# Patient Record
Sex: FEMALE | NOT HISPANIC OR LATINO | Employment: FULL TIME | URBAN - METROPOLITAN AREA
[De-identification: names, ages, dates, MRNs, and addresses within clinical notes are randomized per-mention and may not be internally consistent; named-entity substitution may affect disease eponyms.]

---

## 2017-04-11 ENCOUNTER — THERAPY VISIT (OUTPATIENT)
Dept: PHYSICAL THERAPY | Facility: CLINIC | Age: 47
End: 2017-04-11
Payer: COMMERCIAL

## 2017-04-11 DIAGNOSIS — M25.551 PAIN OF RIGHT HIP JOINT: Primary | ICD-10-CM

## 2017-04-11 PROCEDURE — 97110 THERAPEUTIC EXERCISES: CPT | Mod: GP | Performed by: PHYSICAL THERAPIST

## 2017-04-11 PROCEDURE — 97161 PT EVAL LOW COMPLEX 20 MIN: CPT | Mod: GP | Performed by: PHYSICAL THERAPIST

## 2017-04-11 ASSESSMENT — ACTIVITIES OF DAILY LIVING (ADL)
ROLLING_OVER_IN_BED: NO DIFFICULTY AT ALL
STANDING_FOR_15_MINUTES: NO DIFFICULTY AT ALL
GETTING_INTO_AND_OUT_OF_AN_AVERAGE_CAR: NO DIFFICULTY AT ALL
GOING_DOWN_1_FLIGHT_OF_STAIRS: NO DIFFICULTY AT ALL
HOW_WOULD_YOU_RATE_YOUR_CURRENT_LEVEL_OF_FUNCTION_DURING_YOUR_USUAL_ACTIVITIES_OF_DAILY_LIVING_FROM_0_TO_100_WITH_100_BEING_YOUR_LEVEL_OF_FUNCTION_PRIOR_TO_YOUR_HIP_PROBLEM_AND_0_BEING_THE_INABILITY_TO_PERFORM_ANY_OF_YOUR_USUAL_DAILY_ACTIVITIES?: 4
GOING_UP_1_FLIGHT_OF_STAIRS: NO DIFFICULTY AT ALL
WALKING_INITIALLY: NO DIFFICULTY AT ALL
HOS_ADL_COUNT: 17
HOS_ADL_SCORE(%): 100
STEPPING_UP_AND_DOWN_CURBS: NO DIFFICULTY AT ALL
WALKING_APPROXIMATELY_10_MINUTES: NO DIFFICULTY AT ALL
RECREATIONAL_ACTIVITIES: NO DIFFICULTY AT ALL
SITTING_FOR_15_MINUTES: NO DIFFICULTY AT ALL
WALKING_15_MINUTES_OR_GREATER: NO DIFFICULTY AT ALL
TWISTING/PIVOTING_ON_INVOLVED_LEG: NO DIFFICULTY AT ALL
HEAVY_WORK: NO DIFFICULTY AT ALL
HOS_ADL_ITEM_SCORE_TOTAL: 68
DEEP_SQUATTING: NO DIFFICULTY AT ALL
PUTTING_ON_SOCKS_AND_SHOES: NO DIFFICULTY AT ALL
GETTING_INTO_AND_OUT_OF_A_BATHTUB: NO DIFFICULTY AT ALL
LIGHT_TO_MODERATE_WORK: NO DIFFICULTY AT ALL
HOS_ADL_HIGHEST_POTENTIAL_SCORE: 68
WALKING_DOWN_STEEP_HILLS: NO DIFFICULTY AT ALL
WALKING_UP_STEEP_HILLS: NO DIFFICULTY AT ALL

## 2017-04-11 NOTE — MR AVS SNAPSHOT
"              After Visit Summary   4/11/2017    Jo Ann Black    MRN: [id number]           Patient Information     Date Of Birth          1970        Visit Information        Provider Department      4/11/2017 11:20 AM Maida Felton, PT [facility]        Today's Diagnoses     Pain of right hip joint    -  1       Follow-ups after your visit        Your next 10 appointments already scheduled     Apr 19, 2017  9:20 AM CDT   SUKHDEEP Running with Maida Felton PT   [facility] ([place]  )    [address] [place] [address] [address]  [facility] [id number] [phone]            Apr 25, 2017  9:20 AM CDT   SUKHDEEP Running with Maida Felton PT   [facility] ([place]  )    [address] [place] [address] [address]  [facility] [id number] [phone]              Who to contact     If you have questions or need follow up information about today's clinic visit or your schedule please contact [facility] directly at [phone].  Normal or non-critical lab and imaging results will be communicated to you by Social Strategy 1hart, letter or phone within 4 business days after the clinic has received the results. If you do not hear from us within 7 days, please contact the clinic through Social Strategy 1hart or phone. If you have a critical or abnormal lab result, we will notify you by phone as soon as possible.  Submit refill requests through CÃœR or call your pharmacy and they will forward the refill request to us. Please allow 3 business days for your refill to be completed.          Additional Information About Your Visit        MyChart Information     CÃœR lets you send messages to your doctor, view your test results, renew your prescriptions, schedule appointments and more. To sign up, go to [email or web address] . Click on \"Log in\" on the left side of the " "screen, which will take you to the Welcome page. Then click on \"Sign up Now\" on the right side of the page.     You will be asked to enter the access code listed below, as well as some personal information. Please follow the directions to create your username and password.     Your access code is: [email or web address]  Expires: 7/10/2017  1:31 PM     Your access code will  in 90 days. If you need help or a new code, please call your [place] clinic or [phone].        Care EveryWhere ID     This is your Care EveryWhere ID. This could be used by other organizations to access your [place] medical records  [id number]         Blood Pressure from Last 3 Encounters:   No data found for BP    Weight from Last 3 Encounters:   No data found for Wt              We Performed the Following     HC PT EVAL, LOW COMPLEXITY     SUKHDEEP INITIAL EVAL REPORT     THERAPEUTIC EXERCISES        Primary Care Provider Office Phone # Fax #    Josephine Katelin Marsh [phone]       [facility] [address]  [place] [address]        Thank you!     Thank you for choosing [facility]  for your care. Our goal is always to provide you with excellent care. Hearing back from our patients is one way we can continue to improve our services. Please take a few minutes to complete the written survey that you may receive in the mail after your visit with us. Thank you!             Your Updated Medication List - Protect others around you: Learn how to safely use, store and throw away your medicines at www.disposemymeds.org.          This list is accurate as of: 17  1:31 PM.  Always use your most recent med list.                   Brand Name Dispense Instructions for use    MULTIVITAMIN PO          OMEGA-3 FISH OIL PO            "

## 2017-04-11 NOTE — PROGRESS NOTES
Subjective:                                       Pertinent medical history includes:  None.  Medical allergies: no.  Other surgeries include:  None reported.  Current medications:  None as reported by patient.  Current occupation is attroney.  Patient is working in normal job without restrictions.  Primary job tasks include:  Prolonged sitting.    Barriers include:  None as reported by patient.    Red flags:  None as reported by patient.                        Objective:    System    Physical Exam    General     ROS    Assessment/Plan:

## 2017-04-11 NOTE — PROGRESS NOTES
Subjective:            Pt reports insidious onset of chronic R anterior and posterior R hip tight aching pain over one year ago, March 2016. She feels it may be related to compensating for previous L ankle pain that was treated successfully with PT several years ago..      Radiates to:  Low back.   and is intermittent and reported as 3/10.  Associated symptoms:  Loss of motion/stiffness. Pain is worse during the day.  Symptoms are exacerbated by sitting and walking (anterior tightness with walking, aching deep anterior and post with sitting) Relieved by: some position changes and easy walking helpful some times, has had accupuncture that is helpful temporarily.  Since onset symptoms are gradually worsening.        General health as reported by patient is excellent.                      Red flags:  None as reported by the patient.                        Objective:    Standing Alignment:          Pelvic:  Iliac crest high R  Hip deviations alignment: R>L anteversion         Gait:  Hip hike R, dec R hip ext, IR B R>L femur      Non-Weight Bearing:      Hip:  Femoral anteversion L and femoral anteversion R      Flexibility/Screens:   Positive screens:  Lumbar (mod loss lumbar ext wtih L pelvic rotation )    Lower Extremity:      Decreased right lower extremity flexibility:  Adductors and Hip Flexors          Ankle/Foot Evaluation            MOBILITY TESTING:       Talocrural Left: hypomobile                                                   Hip Evaluation  Hip PROM:              External Rotation: Left:   Right: mod loss                   Hip Palpation:      Right hip tenderness present at:  hip flexors and Piriformis  Right hip tenderness not present at:  Greater Trachanter  Functional Testing:          Quad:    Single leg squat:   Left:    Moderate loss of control and femoral IR  Right:   Significant loss of control and femoral IR                     General     ROS    Assessment/Plan:      Patient is a 47 year old  female with right side hip complaints.    Patient has the following significant findings with corresponding treatment plan.                Diagnosis 1:  R hip pain   Pain -  hot/cold therapy, manual therapy and self management  Decreased ROM/flexibility - manual therapy and therapeutic exercise  Decreased joint mobility - manual therapy and therapeutic exercise  Decreased strength - therapeutic exercise and therapeutic activities  Impaired balance - neuro re-education and therapeutic activities  Impaired gait - gait training  Impaired muscle performance - neuro re-education  Decreased function - therapeutic activities  Impaired posture - neuro re-education  Instability -  Therapeutic Activity  Therapeutic Exercise    Therapy Evaluation Codes:   1) History comprised of:   Personal factors that impact the plan of care:      None.    Comorbidity factors that impact the plan of care are:      None.     Medications impacting care: None.  2) Examination of Body Systems comprised of:   Body structures and functions that impact the plan of care:      Hip.   Activity limitations that impact the plan of care are:      Sitting.  3) Clinical presentation characteristics are:   Stable/Uncomplicated.  4) Decision-Making    Low complexity using standardized patient assessment instrument and/or measureable assessment of functional outcome.  Cumulative Therapy Evaluation is: Low complexity.    Previous and current functional limitations:  (See Goal Flow Sheet for this information)    Short term and Long term goals: (See Goal Flow Sheet for this information)     Communication ability:  Patient appears to be able to clearly communicate and understand verbal and written communication and follow directions correctly.  Treatment Explanation - The following has been discussed with the patient:   RX ordered/plan of care  Anticipated outcomes  Possible risks and side effects  This patient would benefit from PT intervention to resume normal  activities.   Rehab potential is excellent.    Frequency:  1 X week, once daily  Duration:  for 4 weeks  Discharge Plan:  Achieve all LTG.  Independent in home treatment program.  Reach maximal therapeutic benefit.    Please refer to the daily flowsheet for treatment today, total treatment time and time spent performing 1:1 timed codes.

## 2017-04-19 ENCOUNTER — THERAPY VISIT (OUTPATIENT)
Dept: PHYSICAL THERAPY | Facility: CLINIC | Age: 47
End: 2017-04-19
Payer: COMMERCIAL

## 2017-04-19 DIAGNOSIS — M25.551 PAIN OF RIGHT HIP JOINT: ICD-10-CM

## 2017-04-19 PROCEDURE — 97112 NEUROMUSCULAR REEDUCATION: CPT | Mod: GP | Performed by: PHYSICAL THERAPIST

## 2017-04-19 PROCEDURE — 97140 MANUAL THERAPY 1/> REGIONS: CPT | Mod: GP | Performed by: PHYSICAL THERAPIST

## 2017-04-19 PROCEDURE — 97110 THERAPEUTIC EXERCISES: CPT | Mod: GP | Performed by: PHYSICAL THERAPIST

## 2017-05-02 ENCOUNTER — THERAPY VISIT (OUTPATIENT)
Dept: PHYSICAL THERAPY | Facility: CLINIC | Age: 47
End: 2017-05-02
Payer: COMMERCIAL

## 2017-05-02 DIAGNOSIS — M25.551 PAIN OF RIGHT HIP JOINT: ICD-10-CM

## 2017-05-02 PROCEDURE — 97110 THERAPEUTIC EXERCISES: CPT | Mod: GP | Performed by: PHYSICAL THERAPIST

## 2017-05-02 PROCEDURE — 97140 MANUAL THERAPY 1/> REGIONS: CPT | Mod: GP | Performed by: PHYSICAL THERAPIST

## 2023-02-01 ENCOUNTER — OFFICE VISIT (OUTPATIENT)
Dept: VASCULAR SURGERY | Facility: CLINIC | Age: 53
End: 2023-02-01
Payer: COMMERCIAL

## 2023-02-01 DIAGNOSIS — I83.813 VARICOSE VEINS OF BILATERAL LOWER EXTREMITIES WITH PAIN: Primary | ICD-10-CM

## 2023-02-01 PROCEDURE — 99203 OFFICE O/P NEW LOW 30 MIN: CPT | Performed by: SURGERY

## 2023-02-01 RX ORDER — VITAMIN B COMPLEX
TABLET ORAL DAILY
COMMUNITY

## 2023-02-01 RX ORDER — BIOTIN 10000 MCG
CAPSULE ORAL
COMMUNITY
End: 2023-09-13

## 2023-02-01 NOTE — PROGRESS NOTES
VEINSOLUTIONS CONSULTATION    HPI:    Jo Ann Black is a pleasant 52 year old female who is familiar to me, having undergone endovenous ablation and phlebectomies on both lower extremities.  We treated her right GSV, right leg ASV, right SSV and her left GSV. She developed a few recurrent varicose veins about five years post op was progressed until about 5 years ago.  She wears compression and has done so for years, especially with flying.    She describes pain in her left thigh as an ache, tightness, heaviness and a tenderness, worse after prolonged sitting and not really improving with elevation, compression or walking.  She has no history of deep vein thrombosis, superficial thrombophlebitis but does admit to some intermittent swelling.    The symptoms interfere with actives of daily living because sitting for long periods of time causes her legs to become sore, making it difficult for her to be up and about as she would like.  This interferes actives daily living in terms of getting her household chores taking care of and taking care of her family.    Her family history is significant for varicose veins in her mother and in her maternal grandfather    PAST MEDICAL HISTORY: Medical History  Medical History Date Comments   Dizziness       Chest pressure       PAST SURGICAL HISTORY: Bilateral radiofrequency ablation: Right great saphenous, small saphenous and anterior excessively saphenous veins and left great saphenous vein    FAMILY HISTORY:   Good Health Daughter 3       Good Health Daughter 4       Heart Disease Father    Stent at age 65, prior MI. CABG at age 73   Other Mother    Fainting episodes     Family History  Relation Name Status Comments   Daughter 1   Alive     Daughter 2   Alive     Daughter 3         Daughter 4         Father    Alive     Mother             SOCIAL HISTORY:   Smoking Tobacco: Never           Smokeless Tobacco: Never             Social History  Alcohol Use Standard Drinks/Week  Comments   Yes 0 (1 standard drink = 0.6 oz pure alcohol) 1 drink per month     REVIEW OF SYSTEMS: Review Of Systems  Skin: negative  Eyes: negative  Ears/Nose/Throat: negative  Respiratory: No shortness of breath, dyspnea on exertion, cough, or hemoptysis  Cardiovascular: negative, cold induced vasospasm of fingers and toes  Gastrointestinal: negative  Genitourinary: negative  Musculoskeletal: Leg pain, leg swelling  Neurologic: negative  Psychiatric: negative  Hematologic/Lymphatic/Immunologic: negative  Endocrine: negative      Vital signs:  There were no vitals taken for this visit.    Current Outpatient Medications   Medication Sig Dispense Refill     Biotin 10 MG CAPS        Calcium-Magnesium-Zinc 333-133-5 MG TABS per tablet Take 1 tablet by mouth daily       Cyanocobalamin (VITAMIN B 12 PO)        Ferrous Gluconate-C-Folic Acid (IRON-C PO)        Vitamin D3 (CHOLECALCIFEROL) 25 mcg (1000 units) tablet Take by mouth daily         PHYSICAL EXAM:  General: Pleasant, NAD.   HEENT: Normocephalic, atraumatic, external ears and nose normal.   Respiratory: Normal respiratory effort.   Cardiovascular: Pulse is regular.   Musculoskeletal: Gait and station normal.  The joints of her fingers and toes without deformity.  There is no cyanosis of her nailbeds.   EXTREMITIES: Right lower extremity: 4 to 6 mm varicosities coursing from the medial right calf down toward the ankle and extending somewhat posteriorly.  Duskiness and coolness of her toes.  Trace edema.    Left lower extremity: 4 to 5 mm varicosity coursing from the proximal anterior left thigh, down the anteromedial thigh and onto the left medial calf.  Trace edema.  Duskiness of the toes of her left foot.  PULSES: R/L (3=normal pulse, 0=no palpable pulse) dorsalis pedis: 0/0; posterior tibial: 3/3.      Neurologic: Grossly normal  Psychiatric: Mood, affect, judgment and insight are normal     ASSESSMENT:   Recurrent, bilateral lower extremity varicose veins,  symptomatic.  We discussed the lower extremity vein anatomy, pathophysiology of venous insufficiency and the option of continued conservative management with small risk of superficial thrombophlebitis, bleeding and progression of the disease process.  She has worn compression hose for years and exercises vigorously, regularly.    She asked if the cold induced vasospasm of her toes could be related to the venous insufficiency.  I do not believe that it is.  She is not severely limited by the cold induced vasospasm.    She wished to have these recurrent varicosities further evaluated due to the symptoms, therefore we will order a bilateral lower extremity venous competency study, the results which we discussed via a video visit.    Options for treating superficial insufficiency with endovenous ablation, sclerotherapy and/or phlebectomies were discussed.  Risks of treatment including bleeding, infection, nerve injury, deep ecchymosis, scarring and hyperpigmentation were discussed.  She voiced understanding and her questions were answered.    PLAN:  Bilateral lower extremity venous competency studies with video visit to discuss results.    Julian Prince MD    Dictated using Dragon voice recognition software which may result in transcription errors          VEIN CLINIC LEG DRAWING:

## 2023-02-01 NOTE — LETTER
2/1/2023         RE: Jo Ann Black  [address] [facility] [address]        Dear Colleague,    Thank you for referring your patient, Jo Ann Black, to the [facility] [place]. Please see a copy of my visit note below.    VEINSOLUTIONS CONSULTATION    HPI:    Jo Ann Black is a pleasant 52 year old female who is familiar to me, having undergone endovenous ablation and phlebectomies on both lower extremities.  We treated her right GSV, right leg ASV, right SSV and her left GSV. She developed a few recurrent varicose veins about five years post op was progressed until about 5 years ago.  She wears compression and has done so for years, especially with flying.    She describes pain in her left thigh as an ache, tightness, heaviness and a tenderness, worse after prolonged sitting and not really improving with elevation, compression or walking.  She has no history of deep vein thrombosis, superficial thrombophlebitis but does admit to some intermittent swelling.    The symptoms interfere with actives of daily living because sitting for long periods of time causes her legs to become sore, making it difficult for her to be up and about as she would like.  This interferes actives daily living in terms of getting her household chores taking care of and taking care of her family.    Her family history is significant for varicose veins in her mother and in her maternal grandfather    PAST MEDICAL HISTORY: Medical History  Medical History Date Comments   Dizziness       Chest pressure       PAST SURGICAL HISTORY: Bilateral radiofrequency ablation: Right great saphenous, small saphenous and anterior excessively saphenous veins and left great saphenous vein    FAMILY HISTORY:   Good Health Daughter 3       Good Health Daughter 4       Heart Disease Father    Stent at age 65, prior MI. CABG at age 73   Other Mother    Fainting episodes     Family History  Relation Name Status Comments   Daughter  1   Alive     Daughter 2   Alive     Daughter 3         Daughter 4         Father    Alive     Mother             SOCIAL HISTORY:   Smoking Tobacco: Never           Smokeless Tobacco: Never             Social History  Alcohol Use Standard Drinks/Week Comments   Yes 0 (1 standard drink = 0.6 oz pure alcohol) 1 drink per month     REVIEW OF SYSTEMS: Review Of Systems  Skin: negative  Eyes: negative  Ears/Nose/Throat: negative  Respiratory: No shortness of breath, dyspnea on exertion, cough, or hemoptysis  Cardiovascular: negative, cold induced vasospasm of fingers and toes  Gastrointestinal: negative  Genitourinary: negative  Musculoskeletal: Leg pain, leg swelling  Neurologic: negative  Psychiatric: negative  Hematologic/Lymphatic/Immunologic: negative  Endocrine: negative      Vital signs:  There were no vitals taken for this visit.    Current Outpatient Medications   Medication Sig Dispense Refill     Biotin 10 MG CAPS        Calcium-Magnesium-Zinc 333-133-5 MG TABS per tablet Take 1 tablet by mouth daily       Cyanocobalamin (VITAMIN B 12 PO)        Ferrous Gluconate-C-Folic Acid (IRON-C PO)        Vitamin D3 (CHOLECALCIFEROL) 25 mcg (1000 units) tablet Take by mouth daily         PHYSICAL EXAM:  General: Pleasant, NAD.   HEENT: Normocephalic, atraumatic, external ears and nose normal.   Respiratory: Normal respiratory effort.   Cardiovascular: Pulse is regular.   Musculoskeletal: Gait and station normal.  The joints of her fingers and toes without deformity.  There is no cyanosis of her nailbeds.   EXTREMITIES: Right lower extremity: 4 to 6 mm varicosities coursing from the medial right calf down toward the ankle and extending somewhat posteriorly.  Duskiness and coolness of her toes.  Trace edema.    Left lower extremity: 4 to 5 mm varicosity coursing from the proximal anterior left thigh, down the anteromedial thigh and onto the left medial calf.  Trace edema.  Duskiness of the toes of her left foot.  PULSES:  R/L (3=normal pulse, 0=no palpable pulse) dorsalis pedis: 0/0; posterior tibial: 3/3.      Neurologic: Grossly normal  Psychiatric: Mood, affect, judgment and insight are normal     ASSESSMENT:   Recurrent, bilateral lower extremity varicose veins, symptomatic.  We discussed the lower extremity vein anatomy, pathophysiology of venous insufficiency and the option of continued conservative management with small risk of superficial thrombophlebitis, bleeding and progression of the disease process.  She has worn compression hose for years and exercises vigorously, regularly.    She asked if the cold induced vasospasm of her toes could be related to the venous insufficiency.  I do not believe that it is.  She is not severely limited by the cold induced vasospasm.    She wished to have these recurrent varicosities further evaluated due to the symptoms, therefore we will order a bilateral lower extremity venous competency study, the results which we discussed via a video visit.    Options for treating superficial insufficiency with endovenous ablation, sclerotherapy and/or phlebectomies were discussed.  Risks of treatment including bleeding, infection, nerve injury, deep ecchymosis, scarring and hyperpigmentation were discussed.  She voiced understanding and her questions were answered.    PLAN:  Bilateral lower extremity venous competency studies with video visit to discuss results.    Julian Prince MD    Dictated using Dragon voice recognition software which may result in transcription errors          VEIN CLINIC LEG DRAWING:                  Again, thank you for allowing me to participate in the care of your patient.        Sincerely,        Julian Prince MD

## 2023-02-01 NOTE — NURSING NOTE
Patient Reported symptoms:    Right leg   Heaviness A little of the time   Achiness Some of the time   Swelling Some of the time   Throbbing A little of the time   Itching None of the time   Appearance Moderately noticeable   Impact on work/activities Moderately reduced

## 2023-04-09 ENCOUNTER — HEALTH MAINTENANCE LETTER (OUTPATIENT)
Age: 53
End: 2023-04-09

## 2023-09-12 ENCOUNTER — ANCILLARY PROCEDURE (OUTPATIENT)
Dept: ULTRASOUND IMAGING | Facility: CLINIC | Age: 53
End: 2023-09-12
Payer: COMMERCIAL

## 2023-09-12 DIAGNOSIS — I83.813 VARICOSE VEINS OF BILATERAL LOWER EXTREMITIES WITH PAIN: ICD-10-CM

## 2023-09-12 PROCEDURE — 93970 EXTREMITY STUDY: CPT | Performed by: SURGERY

## 2023-09-13 ENCOUNTER — VIRTUAL VISIT (OUTPATIENT)
Dept: VASCULAR SURGERY | Facility: CLINIC | Age: 53
End: 2023-09-13
Payer: COMMERCIAL

## 2023-09-13 DIAGNOSIS — I83.813 VARICOSE VEINS OF BILATERAL LOWER EXTREMITIES WITH PAIN: Primary | ICD-10-CM

## 2023-09-13 PROCEDURE — 99213 OFFICE O/P EST LOW 20 MIN: CPT | Mod: VID | Performed by: SURGERY

## 2023-09-13 NOTE — LETTER
2023         RE: Jo Ann Black  [address] [facility] [address]        Dear Colleague,    Thank you for referring your patient, Jo Ann Black, to the [facility] [place]. Please see a copy of my visit note below.          Jo Ann is a 53 year old who is being evaluated via a billable video visit.      How would you like to obtain your AVS? MyChart  If the video visit is dropped, the invitation should be resent by: Text to cell phone: [phone]  Will anyone else be joining your video visit? No      Telephone-Visit Details    Type of service:  TELEPHONE CALL (8 minute call)      Originating Location (pt. Location): Home    Distant Location (provider location):  On-site  Platform used for Video Visit: Campaign Monitor    [facility] [place] telephone visit documentation      I discussed the results of Jo Ann Burrows and she is bilateral extremity venous competency study.  Please see my previous office dictations for details.  She has recurrent varicose veins with bilateral pain.  The pain is interfering with actives of daily living because after sitting for long periods of time, her legs become painful make it difficult for her to be up and about as she would like.  She cannot get her household chores done or taking care of her family because she has to take breaks and elevate her legs.    Ultrasound  Narrative & Impression   Name:  Jo Ann Black                                             Patient ID: [id number]  Date: 2023                                         : 1970  Sex: female                                                                 Examined by: SHORTY Tomlin RVT  Age:  53 year old                                                         Reading MD: VANNESA Prince     INDICATION:  Patient is referred for varicose veins with pain.      EXAM TYPE  BILATERAL LOWER EXTREMITY VENOUS DUPLEX FOR VENOUS INSUFFICIENCY  TECHNICAL SUMMARY     A duplex  ultrasound study using color flow was performed, to evaluate the bilateral lower extremity veins for valvular incompetence with the patient in a steep reversed trendelenberg.      RIGHT:     The deep veins demonstrate phasic flow, compress and respond to augmentations.  There is no DVT.  The common femoral, mid femoral and distal femoral veins are incompetent and free of thrombus. The remaining deep veins are competent and free of thrombus.      The GSV absent.     The AASV is competent ( 4.9 mm) draining into the saphenofemoral junction.      The Giacomini vein is incompetent ( 3.6 mm) communicating with the small saphenous vein at the knee level. The Giacomini vein is incompetent at the distal thigh with a reflux time of 2252 milliseconds.      The SSV is absent in the proximal to mid calf.  The saphenopopliteal junction is incompetent (8.0 mm).  The SSV is incompetent at the SPJ giving rise to varicose veins and is absent 5 cm from the SPJ.  The SPJ gives rise to multiple varicose branches the largest measuring 5.6 mm off the Knee that courses Posteromedial with a reflux time of 6236 milliseconds.       Perforators: There is an incompetent  vein ( 3.3 mm) at 10 cm from medial mallelous that communicates with PTV to varicose veins.         LEFT:     The deep veins demonstrate phasic flow, compress and respond to augmentations.  There is no reflux or DVT.     The GSV is absent.      The AASV is incompetent ( 4.8 mm) draining into the saphenofemoral junction. The AASV is incompetent from the saphenofemoal junction to the proximal thigh with a reflux time of 5361 milliseconds. The AASV takes a somewhat straight course for 5 cm. The AASV gives rise to a varicose branch measuring 5.8 mm off the Proximal Thigh that courses Toward the ankle with a reflux time of 2343 milliseconds.      The Giacomini vein is competent ( 1.6 mm) communicating with the small saphenous vein at the knee level.      The SSV  demonstrates phasic flow, compresses and responds to augmentations from the popliteal space to the ankle.  No reflux or thrombus is seen. The saphenopopliteal junction is absent.      Perforators: there is no evidence of incompetent  veins at any level.         FINAL SUMMARY:  Right lower extremity:  Deep veins  1.  No deep vein thrombosis  2.  Right common femoral, mid femoral and distal femoral vein incompetence.  The proximal femoral vein and popliteal veins are competent.     Superficial veins  1.  Absent great saphenous vein from the proximal thigh through the mid calf.  The distal great saphenous vein is competent  2.  Absent right small saphenous vein from the proximal calf to the mid calf with a patent, competent distal calf small saphenous vein.  The saphenopopliteal junction is incompetent with varicosities coursing from the saphenopopliteal junction  3.  Incompetent  right mid medial calf  4.  Incompetent distal thigh Vein of Giacomini     Left lower extremity:  Deep veins  No deep vein thrombosis or deep vein valve incompetence     Superficial veins  1.  Absent left great saphenous vein from the proximal thigh to the ankle.  2.  Incompetent left anterior accessory saphenous vein, the source of varicosities in the thigh  3.  Chronic, partially occlusive thrombus noted in varicosity medial to the knee  4.  No incompetent perforators     Incompetence Criteria: greater than 500 milliseconds in superficial and  veins and greater than 1000 milliseconds in deep veins.       Assessment  Recurrent minor lower extremity varicose veins with pain, symptoms recalcitrant to conservative measures and interfering with actives of daily living.  If she chooses treatment, she is a candidate for radiofrequency ablation of the proximal portion of her right small saphenous vein, the source of the varicosities on her posterior medial calf.  In the left lower extremity, her anterior accessory  saphenous vein is incompetent and is the source of the varicose veins in the thigh.  Concomitant phlebectomies could be performed for the large varicosities.    Details procedure including risk of bleeding, infection, nerve injury, deep vein thrombosis were all discussed.  Scarring and hyperpigmentation as risks were also discussed.  Patient voiced understanding and her questions were answered.    Plan  Radiofrequency ablation of the right small saphenous vein, left anterior excessively saphenous vein with bilateral phlebectomies    CStephy Prince MD FACS        Again, thank you for allowing me to participate in the care of your patient.        Sincerely,        Julian Prince MD

## 2023-09-13 NOTE — PROGRESS NOTES
Jo Ann is a 53 year old who is being evaluated via a billable video visit.      How would you like to obtain your AVS? MyChart  If the video visit is dropped, the invitation should be resent by: Text to cell phone: [phone]  Will anyone else be joining your video visit? No      Telephone-Visit Details    Type of service:  TELEPHONE CALL (8 minute call)      Originating Location (pt. Location): Home    Distant Location (provider location):  On-site  Platform used for Video Visit: [facility] [place] telephone visit documentation      I discussed the results of Jo Ann Burrows and she is bilateral extremity venous competency study.  Please see my previous office dictations for details.  She has recurrent varicose veins with bilateral pain.  The pain is interfering with actives of daily living because after sitting for long periods of time, her legs become painful make it difficult for her to be up and about as she would like.  She cannot get her household chores done or taking care of her family because she has to take breaks and elevate her legs.    Ultrasound  Narrative & Impression   Name:  Jo Ann Black                                             Patient ID: [id number]  Date: 2023                                         : 1970  Sex: female                                                                 Examined by: SHORTY Tomlin RVT  Age:  53 year old                                                         Reading MD: VANNESA Prince     INDICATION:  Patient is referred for varicose veins with pain.      EXAM TYPE  BILATERAL LOWER EXTREMITY VENOUS DUPLEX FOR VENOUS INSUFFICIENCY  TECHNICAL SUMMARY     A duplex ultrasound study using color flow was performed, to evaluate the bilateral lower extremity veins for valvular incompetence with the patient in a steep reversed trendelenberg.      RIGHT:     The deep veins demonstrate phasic flow, compress and respond to  augmentations.  There is no DVT.  The common femoral, mid femoral and distal femoral veins are incompetent and free of thrombus. The remaining deep veins are competent and free of thrombus.      The GSV absent.     The AASV is competent ( 4.9 mm) draining into the saphenofemoral junction.      The Giacomini vein is incompetent ( 3.6 mm) communicating with the small saphenous vein at the knee level. The Giacomini vein is incompetent at the distal thigh with a reflux time of 2252 milliseconds.      The SSV is absent in the proximal to mid calf.  The saphenopopliteal junction is incompetent (8.0 mm).  The SSV is incompetent at the SPJ giving rise to varicose veins and is absent 5 cm from the SPJ.  The SPJ gives rise to multiple varicose branches the largest measuring 5.6 mm off the Knee that courses Posteromedial with a reflux time of 6236 milliseconds.       Perforators: There is an incompetent  vein ( 3.3 mm) at 10 cm from medial mallelous that communicates with PTV to varicose veins.         LEFT:     The deep veins demonstrate phasic flow, compress and respond to augmentations.  There is no reflux or DVT.     The GSV is absent.      The AASV is incompetent ( 4.8 mm) draining into the saphenofemoral junction. The AASV is incompetent from the saphenofemoal junction to the proximal thigh with a reflux time of 5361 milliseconds. The AASV takes a somewhat straight course for 5 cm. The AASV gives rise to a varicose branch measuring 5.8 mm off the Proximal Thigh that courses Toward the ankle with a reflux time of 2343 milliseconds.      The Giacomini vein is competent ( 1.6 mm) communicating with the small saphenous vein at the knee level.      The SSV demonstrates phasic flow, compresses and responds to augmentations from the popliteal space to the ankle.  No reflux or thrombus is seen. The saphenopopliteal junction is absent.      Perforators: there is no evidence of incompetent  veins at any level.          FINAL SUMMARY:  Right lower extremity:  Deep veins  1.  No deep vein thrombosis  2.  Right common femoral, mid femoral and distal femoral vein incompetence.  The proximal femoral vein and popliteal veins are competent.     Superficial veins  1.  Absent great saphenous vein from the proximal thigh through the mid calf.  The distal great saphenous vein is competent  2.  Absent right small saphenous vein from the proximal calf to the mid calf with a patent, competent distal calf small saphenous vein.  The saphenopopliteal junction is incompetent with varicosities coursing from the saphenopopliteal junction  3.  Incompetent  right mid medial calf  4.  Incompetent distal thigh Vein of Giacomini     Left lower extremity:  Deep veins  No deep vein thrombosis or deep vein valve incompetence     Superficial veins  1.  Absent left great saphenous vein from the proximal thigh to the ankle.  2.  Incompetent left anterior accessory saphenous vein, the source of varicosities in the thigh  3.  Chronic, partially occlusive thrombus noted in varicosity medial to the knee  4.  No incompetent perforators     Incompetence Criteria: greater than 500 milliseconds in superficial and  veins and greater than 1000 milliseconds in deep veins.       Assessment  Recurrent bilateral lower extremity varicose veins with pain, symptoms recalcitrant to conservative measures and interfering with actives of daily living.  If she chooses treatment, she is a candidate for radiofrequency ablation of the proximal portion of her right small saphenous vein, the source of the varicosities on her posterior medial calf.  In the left lower extremity, her anterior accessory saphenous vein is incompetent and is the source of the varicose veins in the thigh.  Concomitant phlebectomies could be performed for the large varicosities.    Details procedure including risk of bleeding, infection, nerve injury, deep vein thrombosis were all  discussed.  Scarring and hyperpigmentation as risks were also discussed.  Patient voiced understanding and her questions were answered.    Plan  Radiofrequency ablation of the right small saphenous vein, left anterior accessory saphenous vein with bilateral phlebectomies    YAW Prince MD FACS

## 2023-09-13 NOTE — PROGRESS NOTES
September 13, 2023    Vein Procedure Recommendation    Called and spoke with patient.    Dr. Prince has recommended patient to have the following vein procedure(s):     1. Right leg VNUS closure SSV and 10-20 Phlebectomies (medically necessary)    2. Left leg VNUS closure ASV and 10-20 Phlebectomies (medically necessary)    [facility], [place], [address]  NKDA  AHV/AJ: none  Wound: none  AC/ASA: none  Sedation Nausea: none    Patient is recommended to wear Thigh High compression hose following her procedure. Discussed compression hose. Explained to patient if insurance doesn't cover the compression hose there are a couple different options to getting them and to call the clinic to let us know if they need help.    Entered in patient's After Visit Summary (viewable in [employer]) written procedure instructions to review on her own (see After Visit Summary).    Next steps:    Insurance Submission  Informed patient this process could take up to 14 business days, but once approved, the patient will be contacted by our surgery scheduler to schedule the above procedure. Gave patient our surgery scheduler's information.    Patient is in agreement with all of the above and has no further questions at this time.    Miriam Ramsay RN  [facility]  Vein Clinic

## 2023-09-13 NOTE — PATIENT INSTRUCTIONS
Pre-Procedure Instructions:                           VNUS Closure and Phlebectomies   You are having a Closure(s) - where one or more veins are closed and Phlebectomies - where one or more veins are removed.   Insurance  Precertification and/or referral authorization may be required by your insurance company.  We will call your insurance company to verify benefits for the medically necessary part of your procedure.    Your Current Medications and Allergies  To reduce bruising, please do not take aspirin-type medications (Motrin, Aleve, Ibuprofen, Advil, etc.) for three days before your procedure. You may take Tylenol if you need a pain reliever.  Are you on blood thinner medications? (Plavix, Coumadin, Eliquis, Xarelto) Please discuss this with your surgeon. You may resume taking your blood thinner medication after your procedure.  Are you sensitive to latex or adhesives used for fake fingernails? Please let us know!    Driving Escort and   Please arrange to have a trusted adult (18 years old or older) drive you to and from the clinic.  For your safety, we recommend you have a trusted adult to stay with you until the next morning.    Your Health  If you have a change in your health before the procedure, contact our office immediately. (For example: cold symptoms, cough, urinary tract infection, fever, flu symptoms.)  A pre-procedure physical is not required.    Note  It is sometimes necessary to adjust the procedure schedule due to emergencies. We greatly appreciate your flexibility and understanding in this matter.                  Check List: The Morning of Your Procedure  ___1. Please do not put anything on your leg(s) or shave the day of your procedure.  ___2. You may take your normal medications the day of your procedure.  ___3. It is recommended you eat a light breakfast or lunch the day of your procedure.  ___4. Wear comfortable loose-fitting clothing and wide-fitting shoes (i.e. tennis shoes,  slip-ons).  ___5. Please arrive at our clinic at the specified time given by the nurse.  ___6. You will sign an affirmation of informed consent.  ___7. Bring your pre-procedure sedation medication (lorazepam and clonidine) with you to the clinic.              One hour before your procedure, you will be instructed to take these medications. The lorazepam              (Ativan) lowers anxiety and sedates you; the clonidine makes the lorazepam more effective. Everyone's              body processes these medications differently. Therefore, reactions to these medications vary. Some              people stay awake and some people sleep through the whole procedure. You may not remember              everything about the procedure or the day. You do not want to make any big decisions for the rest of the              day.    The Day of Your Procedure:       VNUS Closure and Phlebectomies  In the Exam Room  A nurse will bring you back to an exam room with your family member or friend. This is when your informed consent will be signed, and you will take your pre-procedure medications.  You will be asked to remove everything from the waist down, including undergarments. You will then put on a hospital gown or shorts and blue booties.  Your surgeon will come in to answer any questions and karri any bulging varicose veins to be removed.  You will be taken to the restroom to empty your bladder before going into the procedure room.    In the Procedure Room  You will be escorted to the procedure room. You will lie on a procedure table covered with a sheet or blanket.  A nurse will put a blood pressure cuff on your arm and a pulse/oxygen monitor on a finger. Your vital signs will be monitored every 15 minutes.  Your gown will be pulled up slightly and the groin exposed for a short period of time. The surgeon's assistant will clean your foot, leg, and groin with an antibacterial solution. We will get you covered up as quickly as  possible!  Sterile towels and blue drapes will be used to cover you and the table. You will be asked to keep your hands under the blue drapes during the procedure.  The lights will be turned down. The table will be tipped so your head is higher than your feet. You may feel like you're going to slide off, but you won't.    The Procedure  The surgeon will visualize your veins with an ultrasound machine. He or she will then numb your skin and access the vein. A catheter is passed up the vein and positioned with ultrasound guidance. The table will then be tipped head down.  Once the catheter is in the correct position, medication will be injected to numb your leg. You will feel some needle sticks and may feel discomfort as the medication goes in. Once this is done, you should not experience significant discomfort. But if you do, please let us know and more numbing medication can be injected. As the catheter sends out heat, the vein closes off and the catheter is withdrawn.  For the phlebectomy part of the procedure, small incisions are made where the bulging varicose veins have been marked on your leg(s) and these veins will be removed using a small judith hook instrument.    Post-Procedure  Once the procedure is done, your leg(s) will be washed with warm water and dried. Your leg(s) will be bandaged with large soft dressings and a large ACE bandage wrapped from toes to groin.   You will be offered something to drink and a light snack.  You will rest with your leg(s) elevated for approximately 30 minutes. Your friend or family member may join you.  For your safety, you will be taken to your car in a wheelchair. If you are able to, it is good to keep your leg(s) elevated on the car ride home.    Post-Procedure Instructions:             VNUS Closure and Phlebectomies      Post-Op Day Zero - The Day of Your Procedure:0  1. Medication for Pain Control and Inflammation Control   - The numbing medication injected during your  procedure will last for several hours. The pre-procedure                 tablets may make you very sleepy and you might not remember everything from the procedure or from                 the day. This will usually wear off by the next day.   - Ibuprofen:  If tolerated, take ibuprofen (e.g., Advil) to reduce inflammation whether or not you have                 pain. For three days, take two tablets (200 mg each) with every meal and at bedtime with a snack. If                 your pain is not controlled with ibuprofen, you may take prescription pain medication (such as Norco),                 if prescribed.   - You may resume taking any medications you were taking before your procedure.  2. Activity   - Rest with your leg(s) elevated above your heart. This will prevent from a lot of swelling and                 bleeding. You do not need to elevate your leg(s) while sleeping at night. You may go upstairs, sit up to                 eat, use the bathroom, and take several five-minute walks. Otherwise, keep your leg(s) elevated.                 Minimize the amount of time you are up on your feet to about 30 minutes at a time.  3. Bandages   - The incision sites will be covered with soft bandages and an ACE wrap. Keep your bandages on                 and dry for 48 hours. The ACE should provide  snug  compression but should not cause pain or                 numbness in the toes. If you have significant discomfort or your toes become cold or numb, unwrap                 your ACE and rewrap with less tension starting at the toes wrapping upward.  4. Incisions   - Bleeding: You may see some incision sites that are oozing through the bandages. This is not unusual      and can be managed with Rest, Ice, Compression and Elevation (RICE). Apply ice and firm pressure      directly to the site that is bleeding and rest with your leg(s) elevated above your heart for 20-30                 minutes.    Post-Op Day One:  1.  Medication   - Ibuprofen: Continue the same as the Day of Your Procedure. If your pain is not controlled with                 ibuprofen, you may take prescription pain medication (such as Norco), if prescribed.   2. Activity   - We would like you to get up at least six times and walk around for short periods of time, unless it is      causing you pain. You should not be on your feet more than 90 minutes at a time. Elevate your leg      above your heart when you are not walking.  3. Bandages   - Your bandages must be kept on and dry for 48 hours.  4. Driving   - You may resume driving when you can do so safely. Do not drive if you are taking narcotic pain      medication.  Post-Op Day Two:   1. Medication  - Ibuprofen: Continue the same as the Day of Your Procedure.  2.  Activity  - Walk as tolerated. Elevate as much as possible when not walking.  3. Bandages and Compression  - Remove ACE wrap and padding. Shower and put on your compression hose during waking hours only       for at least 5 days. (Your doctor may instruct you to keep your bandages on until your return     appointment; please follow your doctor's instructions.)  4. Incisions  - Your leg(s) will be bruised; there may be swelling, hard knots under the skin and possibly some     numbness. These will likely resolve over time. If you see  hair-like  strings coming out of your     incisions, do not pull them (this will only cause pain/discomfort). We will trim them when you come     back for your follow-up appointment.  5. Call Us If:   - You see any areas on your leg that are red and angry in appearance.   - You notice any drainage that is milky or cloudy in appearance or that has a foul odor.   - You run a temperature of 100.5 or greater.    Post-Op Day Three:  You will have a follow up appointment 2-4 days post-procedure. At this appointment, you will have an ultrasound and we will check your incisions.     ________________________________________________________________________________________    The Two Weeks Following Your Procedure  1.  Skin Care   - Do not use any lotions, creams or powders on your incisions for 14 days or until the incisions have                 healed.   - Do not soak in a bathtub, hot tub or go swimming for 14 days or until your incisions have healed.  2.  Medications   - You may use ibuprofen or acetaminophen (e.g., Tylenol) as needed for pain or discomfort.  3.  Activity   - Do not lift over 25 pounds. After about two weeks you may resume exercise such as aerobics,                 running, tennis or weightlifting. Use your common sense and ease back into your exercise routine                 slowly.   - You may feel a cord-like tightness along the inside of your leg. Gentle stretching can be helpful.  4. Compression Hose   - Your doctor may instruct you to wear compression for longer than seven days; please follow your                 doctor's instructions. As a comfort measure, you may choose to wear compression for longer than                 required.  5.  Travel   - Do not fly in an airplane for 14 days after your procedure. If you have a long car trip planned within                 two to three weeks following your procedure, stop and walk for a few minutes every two hours.      Periodic ankle pumps during the ride may be helpful.    Six Week Appointment  - At your six-week appointment, you will see your surgeon for an exam and evaluation. This office visit     will be scheduled when you return for post-op day three return appointment.       Return to Work  1.  If you work outside the home, you may return to work in a few days depending on the extent of your        procedure, how you tolerate it, and the type of work you perform.  2.  Paperwork: If your employer requires paperwork or you would like a letter written to your employer, please        let us know. We will complete  disability type forms at no charge. Please allow five business days for forms        to be completed.

## 2023-12-26 ENCOUNTER — TELEPHONE (OUTPATIENT)
Dept: VASCULAR SURGERY | Facility: CLINIC | Age: 53
End: 2023-12-26
Payer: COMMERCIAL

## 2023-12-26 DIAGNOSIS — I83.813 VARICOSE VEINS OF BILATERAL LOWER EXTREMITIES WITH PAIN: Primary | ICD-10-CM

## 2023-12-26 NOTE — TELEPHONE ENCOUNTER
Compression Hose Order  Pt would like 1 pair(s) of Beige, closed-toe, thigh high, 20-30mmhg compression hose.    Please have patient's compression hose ready on back shelf in [place] for patient to  in clinic.    Patient has Procedure on 1/16/24 with Dr. Prince.    Tania Hudson  [facility]  Vein Clinic

## 2023-12-27 NOTE — TELEPHONE ENCOUNTER
Pt's hose are ready on back shelf in [place].    Theresa Pierre RN  [facility]  Vein [facility]

## 2024-01-23 ENCOUNTER — TELEPHONE (OUTPATIENT)
Dept: VASCULAR SURGERY | Facility: CLINIC | Age: 54
End: 2024-01-23
Payer: COMMERCIAL

## 2024-01-23 DIAGNOSIS — I83.813 VARICOSE VEINS OF BILATERAL LOWER EXTREMITIES WITH PAIN: Primary | ICD-10-CM

## 2024-01-23 RX ORDER — LORAZEPAM 1 MG/1
TABLET ORAL
Qty: 3 TABLET | Refills: 0 | Status: SHIPPED | OUTPATIENT
Start: 2024-01-23 | End: 2024-02-01

## 2024-01-23 RX ORDER — CLONIDINE HYDROCHLORIDE 0.1 MG/1
TABLET ORAL
Qty: 1 TABLET | Refills: 0 | Status: SHIPPED | OUTPATIENT
Start: 2024-01-23 | End: 2024-02-01

## 2024-01-23 NOTE — TELEPHONE ENCOUNTER
1/23/2024    Vein Clinic Preoperative Nurse Call    Procedure: Right leg VNUS closure SSV(med nec), Left leg VNUS closure ASV(med nec), [place]. leg 10-20 stab phlebs(med nec) each leg   Date: Tuesday 1/30/24  Surgeon: Dr. Prince  Time: 1300  Check in time: 1200    Called patient and left a detailed message. Informed patient: when to check in (1200) to sign consent, to bring their preop medications in their original bottle with them (3mg ativan, 0.1mg clonidine). Patient will take the medications after signing the consent to the procedure. Instructed patient to wear loose-fitting comfortable clothing, and bring their compression hose. Ensured patient has a /someone that will be responsible for them the rest of the day. Once procedure is completed, we will keep patient in recovery for 30-45 mins, and call  with aftercare instructions. Informed patient, that if possible, they should sit in the backseat to elevate their leg on the ride home.    Pt needs Thigh High compression hose for procedure. Status of the hose: reserved for patient on back shelf, labeled with her name. Patient has paid.      Patient understands if they have any of the following symptoms (fever, cough, shortness of breath, rash), they need to notify us immediately as they may need to cancel their procedure and reschedule for a later date.    Gave patient our call back number if any further questions or concerns.    Tania Mccormick RN  [facility] Vein Clinic

## 2024-01-30 ENCOUNTER — ALLIED HEALTH/NURSE VISIT (OUTPATIENT)
Dept: VASCULAR SURGERY | Facility: CLINIC | Age: 54
End: 2024-01-30
Payer: COMMERCIAL

## 2024-01-30 ENCOUNTER — OFFICE VISIT (OUTPATIENT)
Dept: VASCULAR SURGERY | Facility: CLINIC | Age: 54
End: 2024-01-30
Payer: COMMERCIAL

## 2024-01-30 VITALS — SYSTOLIC BLOOD PRESSURE: 94 MMHG | DIASTOLIC BLOOD PRESSURE: 58 MMHG | OXYGEN SATURATION: 99 % | HEART RATE: 51 BPM

## 2024-01-30 DIAGNOSIS — I83.813 VARICOSE VEINS OF BOTH LOWER EXTREMITIES WITH PAIN: Primary | ICD-10-CM

## 2024-01-30 PROCEDURE — 36475 ENDOVENOUS RF 1ST VEIN: CPT | Mod: RT | Performed by: SURGERY

## 2024-01-30 PROCEDURE — 37765 STAB PHLEB VEINS XTR 10-20: CPT | Mod: 50 | Performed by: SURGERY

## 2024-01-30 PROCEDURE — A6533 GC STOCKING THIGHLNGTH 18-30: HCPCS

## 2024-01-30 PROCEDURE — 99207 PR NO CHARGE NURSE ONLY: CPT

## 2024-01-30 NOTE — PROGRESS NOTES
Pre-procedure Nursing Note    Jo Ann Black presents to clinic for Vein Procedure  .   /Person Responsible for Patient: Woo (Spouse)  Phone Number: [phone]    Prophylactic Medication:N/A   Sedation Medication: Ativan, 3 mg ,   Time Taken: 1218 and Clonidine, 0.1 mg,   Time Taken: 1218  Compression Stockings: Patient brought with today.  The procedure is being performed on BLE.  Patient understanding of procedure matches consent? YES    Patient's pre-procedure medications verified by Tania Mccormick RN.    Tania Mccormick RN on 1/30/2024 at 12:14 PM

## 2024-01-30 NOTE — PROGRESS NOTES
Patient purchased one pair of beige, thigh high, close-toe, size 2 compression hose from the clinic today.     Informed patient all compression hose purchases are final.    Tania Mccormick RN on 1/30/2024 at 3:42 PM

## 2024-01-30 NOTE — PROGRESS NOTES
Vein Clinic Procedure Note    Indications:  Recurrent, bilateral extremity varicose veins with pain; symptoms recalcitrant to conservative measures    Procedure:  Radiofrequency ablation right small saphenous vein  Multiple, medically necessary right lower extremity phlebectomies (10-20 stabs)  Multiple, medically necessary left lower extremity phlebectomies (10-20 stabs)  Attempted radiofrequency ablation left anterior accessory saphenous vein-unsuccessful    Surgeon  KOFI Prince MD    Procedure Description  Details of the procedure including risks of bleeding, infection, nerve injury, scarring, hyperpigmentation, deep vein thrombosis, recanalization of the left anterior accessory saphenous vein or right small saphenous vein and recurrent varicose veins all discussed.  The patient voiced understanding and wished to proceed.  Informed consent was obtained.     I had the patient stand and marked varicosities from the left anterior thigh down the medial thigh to the knee and right lower extremity from the popliteal fossa down the posteromedial calf as well as on the right ankle with an indelible marker.  We then proceeded the operating room, had the patient lie supine on the operating table, then prepped and draped the bilateral lower extremities sterilely.    We took a timeout to confirm the appropriate operative site and procedure: Radiofrequency ablation of the left anterior accessory saphenous vein, right small saphenous vein and bilateral, medic necessary phlebectomies    VNUS Closure  I imaged the left anterior thigh easily identifying the left anterior accessory saphenous vein.  I infiltrated the skin overlying the vein about 7 cm distal to the groin crease, placed a micropuncture needle into the vein followed by a 035 steerable guidewire to access the anterior accessory saphenous vein as it was slightly tortuous.  I then attempted to pass the sheath but could not advance the sheath cephalad enough in the  vein to allow passage of the closure fast device.  After multiple attempts, I aborted trying to pass the sheath.    We infiltrated the tissues surrounding each of the marked varicosities on the left thigh and calf with tumescent anesthetic, made stab wounds with an 11 scalpel and then remove the bulging veins on the left leg using either judith hooks or vein Hillister.  Mosquito clamps were used to clamp the veins and they were Removed.  Hemostasis was secured with pressure.    After complete the phlebectomies, we had the patient turn prone on the table and we imaged the right small saphenous vein.  We accessed the vein near the mid calf, past micropuncture and position approximately 3 cm from the saphenopopliteal junction.    Tumescent anesthetic was injected along the course of the guidewire followed by a 7 Swiss sheath.  The 3 cm treatment length closure fast device was then passed small saphenous vein with care to inject circumferentially to create a halo of tumescent around the vein.  We injected tumescent acetic around each of the marked varicosities on the posterior calf as well.    After allowing the block to take effect and after confirming appropriate position of the catheter, I applied compression to the proximal posterior calf with the ultrasound probe and additional width 2 fingerbreadths treating the first three 3 cm increments of the vein with 2 RF sessions each.  The remaining vein was treated with 1 RF session to each 3 cm increment with the exception of segments of vein where energy readings were higher requiring additional treatments.  We treated down to near the mid calf.  The patient was comfortable throughout.    After completing the pullback, I reimaged the vein and the vein to be non-compressible and closed.  The saphenofemoral junction and common femoral veins were fully compressible and free of thrombus. The sheath and the catheter were removed and hemostasis secured with  pressure.    Phlebectomies  We made stab wounds beside each of the marked varicosities with 11 scalpel, retrieved the veins with either vein hooks or judith hooks, clamped them with mosquito clamps and avulsed them.  Hemostasis was secured with pressure.    After completing the phlebectomies, the leg was cleaned with saline solution and petroleum jelly was applied to the skin.  The leg was then dressed with ABD pads, cast padding and an Ace bandage from the toes to the groin.   We observed the patient for 30 minutes to ensure excellent hemostasis then took the patient to their ride in a wheelchair.  Post procedure instructions were given to the patient and her  in verbal and written form.  They both voiced understanding and their questions were answered.    The patient tolerated the procedure well without evidence of allergic reaction or other complications and will return in 72 hours for a right lower extremity venous ultrasound.    [place] Closure    Date/Time: 1/30/2024 2:49 PM    Performed by: Julian Prince MD  Authorized by: Julian Prince MD    Time out: Immediately prior to the procedure a time out was called    Preparation: Patient was prepped and draped in usual sterile fashion    1st Assist:  Jennifer Holley CST/CORBIN    Circulator:  Other    Procedure:  VNUS  Procedure side:  Right  One Vein    Vein Treated:  SSV  Patient tolerance:  Patient tolerated the procedure well with no immediate complications  Wrap/Hose:  Wraps  Phlebectomy    Date/Time: 1/30/2024 2:50 PM    Performed by: Julian Prince MD  Authorized by: Julian Prince MD    Procedure:  Phlebectomies  Type:  Medically Necessary  Procedure side:  Right  Stabs:  10-20  Patient tolerance:  Patient tolerated the procedure well with no immediate complications  Wrap/Hose:  Wraps  Phlebectomy    Date/Time: 1/30/2024 2:50 PM    Performed by: Julian Prince MD  Authorized by: Niki  Julian Francis MD    Procedure:  Phlebectomies  Type:  Medically Necessary  Procedure side:  Left  Stabs:  10-20  Patient tolerance:  Patient tolerated the procedure well with no immediate complications  Wrap/Hose:  Wraps          1/30/2024     1:10 PM   Flowsheet Data   Procedure Start Time: 13:10   Prep: Chloraprep   Side: Bilateral   Tx Length (cm): RIGHT SSV: 15   Junction (cm): RIGHT SSV: 3   RF Cycles: RIGHT SSV: 9   RF TX Time (Minutes): RIGHT SSV: 3:00   # PHLEB Sites: RIGHT:10   LEFT:11   Sedation taken: Yes   Pre Pt. Physical / Cognitive Limitations: WNL   TOTAL Local anesthesia Injected (ml): 4   Max Volume Local Anesthesia (ml): 11   TOTAL Tumescent Injected volume (ml): 286   Max Volume Tumescent (ml): 572   Post Pt. Physical / Cognitive Limitations: WNL   Procedure End Time: 14:20   D/C Instructions given, states readiness to leave and escorted to car: Yes       C Penny Prince MD    Dictated using Dragon voice recognition software which may result in transcription errors

## 2024-01-30 NOTE — LETTER
1/30/2024         RE: Jo Ann Black  [address] [facility] [address]        Dear Colleague,    Thank you for referring your patient, Jo Ann Black, to the [facility]. Please see a copy of my visit note below.    Pre-procedure Nursing Note    Jo Ann Black presents to clinic for Vein Procedure  .   /Person Responsible for Patient: Woo (Spouse)  Phone Number: [phone]    Prophylactic Medication:N/A   Sedation Medication: Ativan, 3 mg ,   Time Taken: 1218 and Clonidine, 0.1 mg,   Time Taken: 1218  Compression Stockings: Patient brought with today.  The procedure is being performed on BLE.  Patient understanding of procedure matches consent? YES    Patient's pre-procedure medications verified by Tania Mccormick RN.    Tania Mccormick RN on 1/30/2024 at 12:14 PM        Vein Clinic Procedure Note    Indications:  Recurrent, bilateral extremity varicose veins with pain; symptoms recalcitrant to conservative measures    Procedure:  Radiofrequency ablation right small saphenous vein  Multiple, medically necessary right lower extremity phlebectomies (10-20 stabs)  Multiple, medically necessary left lower extremity phlebectomies (10-20 stabs)  Attempted radiofrequency ablation left anterior accessory saphenous vein-unsuccessful    Surgeon  KOFI Prince MD    Procedure Description  Details of the procedure including risks of bleeding, infection, nerve injury, scarring, hyperpigmentation, deep vein thrombosis, recanalization of the left anterior accessory saphenous vein or right small saphenous vein and recurrent varicose veins all discussed.  The patient voiced understanding and wished to proceed.  Informed consent was obtained.     I had the patient stand and marked varicosities from the left anterior thigh down the medial thigh to the knee and right lower extremity from the popliteal fossa down the posteromedial calf as well as on the right ankle with an indelible marker.  We  then proceeded the operating room, had the patient lie supine on the operating table, then prepped and draped the bilateral lower extremities sterilely.    We took a timeout to confirm the appropriate operative site and procedure: Radiofrequency ablation of the left anterior accessory saphenous vein, right small saphenous vein and bilateral, medic necessary phlebectomies    VNUS Closure  I imaged the left anterior thigh easily identifying the left anterior accessory saphenous vein.  I infiltrated the skin overlying the vein about 7 cm distal to the groin crease, placed a micropuncture needle into the vein followed by a 035 steerable guidewire to access the anterior accessory saphenous vein as it was slightly tortuous.  I then attempted to pass the sheath but could not advance the sheath cephalad enough in the vein to allow passage of the closure fast device.  After multiple attempts, I aborted trying to pass the sheath.    We infiltrated the tissues surrounding each of the marked varicosities on the left thigh and calf with tumescent anesthetic, made stab wounds with an 11 scalpel and then remove the bulging veins on the left leg using either judith hooks or vein Davy.  Mosquito clamps were used to clamp the veins and they were Removed.  Hemostasis was secured with pressure.    After complete the phlebectomies, we had the patient turn prone on the table and we imaged the right small saphenous vein.  We accessed the vein near the mid calf, past micropuncture and position approximately 3 cm from the saphenopopliteal junction.    Tumescent anesthetic was injected along the course of the guidewire followed by a 7 Indonesian sheath.  The 3 cm treatment length closure fast device was then passed small saphenous vein with care to inject circumferentially to create a halo of tumescent around the vein.  We injected tumescent acetic around each of the marked varicosities on the posterior calf as well.    After allowing the block  to take effect and after confirming appropriate position of the catheter, I applied compression to the proximal posterior calf with the ultrasound probe and additional width 2 fingerbreadths treating the first three 3 cm increments of the vein with 2 RF sessions each.  The remaining vein was treated with 1 RF session to each 3 cm increment with the exception of segments of vein where energy readings were higher requiring additional treatments.  We treated down to near the mid calf.  The patient was comfortable throughout.    After completing the pullback, I reimaged the vein and the vein to be non-compressible and closed.  The saphenofemoral junction and common femoral veins were fully compressible and free of thrombus. The sheath and the catheter were removed and hemostasis secured with pressure.    Phlebectomies  We made stab wounds beside each of the marked varicosities with 11 scalpel, retrieved the veins with either vein hooks or judith hooks, clamped them with mosquito clamps and avulsed them.  Hemostasis was secured with pressure.    After completing the phlebectomies, the leg was cleaned with saline solution and petroleum jelly was applied to the skin.  The leg was then dressed with ABD pads, cast padding and an Ace bandage from the toes to the groin.   We observed the patient for 30 minutes to ensure excellent hemostasis then took the patient to their ride in a wheelchair.  Post procedure instructions were given to the patient and her  in verbal and written form.  They both voiced understanding and their questions were answered.    The patient tolerated the procedure well without evidence of allergic reaction or other complications and will return in 72 hours for a right lower extremity venous ultrasound.    Laly Closure    Date/Time: 1/30/2024 2:49 PM    Performed by: Julian Prince MD  Authorized by: Julian Prince MD    Time out: Immediately prior to the procedure a time  out was called    Preparation: Patient was prepped and draped in usual sterile fashion    1st Assist:  Jennifer Holley, CST/CSFA    Circulator:  Other    Procedure:  VNUS  Procedure side:  Right  One Vein    Vein Treated:  SSV  Patient tolerance:  Patient tolerated the procedure well with no immediate complications  Wrap/Hose:  Wraps  Phlebectomy    Date/Time: 1/30/2024 2:50 PM    Performed by: Julian Prince MD  Authorized by: Julian Prince MD    Procedure:  Phlebectomies  Type:  Medically Necessary  Procedure side:  Right  Stabs:  10-20  Patient tolerance:  Patient tolerated the procedure well with no immediate complications  Wrap/Hose:  Wraps  Phlebectomy    Date/Time: 1/30/2024 2:50 PM    Performed by: Julian Prince MD  Authorized by: Julian Prince MD    Procedure:  Phlebectomies  Type:  Medically Necessary  Procedure side:  Left  Stabs:  10-20  Patient tolerance:  Patient tolerated the procedure well with no immediate complications  Wrap/Hose:  Wraps          1/30/2024     1:10 PM   Flowsheet Data   Procedure Start Time: 13:10   Prep: Chloraprep   Side: Bilateral   Tx Length (cm): RIGHT SSV: 15   Junction (cm): RIGHT SSV: 3   RF Cycles: RIGHT SSV: 9   RF TX Time (Minutes): RIGHT SSV: 3:00   # PHLEB Sites: RIGHT:10   LEFT:11   Sedation taken: Yes   Pre Pt. Physical / Cognitive Limitations: WNL   TOTAL Local anesthesia Injected (ml): 4   Max Volume Local Anesthesia (ml): 11   TOTAL Tumescent Injected volume (ml): 286   Max Volume Tumescent (ml): 572   Post Pt. Physical / Cognitive Limitations: WNL   Procedure End Time: 14:20   D/C Instructions given, states readiness to leave and escorted to car: Yes       VANNESA Prince MD    Dictated using Dragon voice recognition software which may result in transcription errors      Again, thank you for allowing me to participate in the care of your patient.        Sincerely,        Julian Prince MD

## 2024-02-01 ENCOUNTER — ALLIED HEALTH/NURSE VISIT (OUTPATIENT)
Dept: VASCULAR SURGERY | Facility: CLINIC | Age: 54
End: 2024-02-01
Attending: SURGERY
Payer: COMMERCIAL

## 2024-02-01 ENCOUNTER — ANCILLARY PROCEDURE (OUTPATIENT)
Dept: ULTRASOUND IMAGING | Facility: CLINIC | Age: 54
End: 2024-02-01
Attending: SURGERY
Payer: COMMERCIAL

## 2024-02-01 DIAGNOSIS — Z09 POSTOP CHECK: Primary | ICD-10-CM

## 2024-02-01 DIAGNOSIS — I83.813 VARICOSE VEINS OF BILATERAL LOWER EXTREMITIES WITH PAIN: ICD-10-CM

## 2024-02-01 PROCEDURE — 93970 EXTREMITY STUDY: CPT | Performed by: SURGERY

## 2024-02-01 PROCEDURE — 99207 PR NO CHARGE NURSE ONLY: CPT

## 2024-02-01 NOTE — PATIENT INSTRUCTIONS
Vein Closure (Ablation)  Common Things to Expect    A small lump may develop beneath the site(s) where the vein closure device was inserted. This is a normal step in healing. These should not be painful, but may be tender to the touch. It can take 6 weeks to 3 months for the lumps/firmness to resolve.   Bruising will look worse before it looks better and can last for 4-6 weeks.  After about 10 days, you may notice tightness/pulling on the inside thigh and knee. As your ablated vein is healing, it contracts, causing a tightness or pulling sensation. This may last for several weeks, but will resolve. Treat it with Ibuprofen or Advil.  Numbness will get better with time, but may take 3 months to a year to resolve.  You may notice that the skin on your legs has become ultra-sensitive to touch. For example, the weight of your sheets may feel painful. This usually resolves in 6 weeks.  Ankle swelling is not uncommon and may last 4-6 weeks.   To get optimal results from your procedure, wearing your compression hose is key for the first 7 days. This is necessary to ensure proper closure of the ablated vein.  For 2 weeks, no weight lifting over 25lbs, no running, and no vigorous aerobic exercise. After this time, ease back into your normal activities. If you do too much too soon, you will have more pain and bruising and possibly re-open the vein that was closed. It takes about 2 weeks for the ablated vein to permanently close. Keep in mind your body is still healing.  For 2 weeks, do not shave your legs or use lotions, powders, creams to allow proper healing of phlebectomy sites and vein access sites.      Vein Removal (Phlebectomy)  Common Things to Expect     Small lumps may develop beneath phlebectomy sites. This is a normal step in healing.  These should not be painful, but may be tender to the touch. It can take 6 weeks to 3 months for these lumps/firmness to resolve. About 3-4 weeks after your phlebectomies, when the  bruising has subsided and the incisions are healed, gentle massage will aid in the healing of these small lumps.  Bruising will look worse before it looks better and can last for 4-6 weeks.  Ankle swelling is not uncommon and may last 4-6 weeks.       If you are experiencing any of the following symptoms, please seek immediate medical attention at your local emergency department.  - Significant pain in the back of the calf possibly with difficulty walking  - Significant swelling and/or tenderness in the back of the calf  - Redness that continues to spread  - Chest pain and/or shortness of breath

## 2024-02-01 NOTE — PROGRESS NOTES
February 1, 2024    Vein Clinic Postoperative Nurse Note    Patient is here for her 48 hour postoperative visit.    Procedure: Right leg VNUS closure SSV(med nec), *Did not close Left leg ASV(med nec)*, [place]. leg 10-20 stab phlebs(med nec) each leg  Procedure Date: 1/30/24  Surgeon: Dr. Prince    Ultrasound Result: The right SSV is closed 20.7mm from SPJ to mid calf. The left ASV is partially closed for a short segment 14.1mm from SFJ. No evidence of BLE DVT.     Physical Exam: Incisions are approximated without signs of infection.  Ecchymosis: moderate  Swelling: none  Paresthesia: pt denies numbness    Patient Questions or Concerns: Overall, pt is doing well and has no concerns at this time except just wondering about the left ASV is this will be looked at during her next appt.    Pt is able to don her thigh high compression hose.    Reviewed postoperative instructions with patient and provided her with written material of common things to expect from her procedure.    Patient's Next Vein Clinic Appointment: 6 week post op with Dr. Prince (4/9/24) - scheduled in U/S room for CPN to view left ASV since did not close on DOS.    Theresa Pierre, RN  [facility]

## 2024-04-06 ENCOUNTER — HEALTH MAINTENANCE LETTER (OUTPATIENT)
Age: 54
End: 2024-04-06

## 2025-04-19 ENCOUNTER — HEALTH MAINTENANCE LETTER (OUTPATIENT)
Age: 55
End: 2025-04-19

## 2025-05-10 ENCOUNTER — HEALTH MAINTENANCE LETTER (OUTPATIENT)
Age: 55
End: 2025-05-10